# Patient Record
Sex: FEMALE | Race: WHITE | NOT HISPANIC OR LATINO | URBAN - METROPOLITAN AREA
[De-identification: names, ages, dates, MRNs, and addresses within clinical notes are randomized per-mention and may not be internally consistent; named-entity substitution may affect disease eponyms.]

---

## 2024-05-16 ENCOUNTER — TELEHEALTH PROVIDED OTHER THAN IN PATIENT'S HOME (OUTPATIENT)
Dept: URBAN - METROPOLITAN AREA TELEHEALTH 3 | Facility: TELEHEALTH | Age: 54
End: 2024-05-16
Payer: MEDICAID

## 2024-05-16 VITALS — HEIGHT: 65 IN | WEIGHT: 148 LBS

## 2024-05-16 DIAGNOSIS — R10.32 LEFT LOWER QUADRANT PAIN: ICD-10-CM

## 2024-05-16 PROCEDURE — 99204 OFFICE O/P NEW MOD 45 MIN: CPT | Mod: 95 | Performed by: PHYSICIAN ASSISTANT

## 2024-05-16 NOTE — SERVICEHPINOTES
PATIENT VERIFIED BY DATE OF BIRTH AND NAME. Patient has been consented for this telecommunication visit using [employer] application.   Last July, she started having pain on her side--had a CT scan that showed she had diverticulitis. Pain used to be a stabbing pain--now has a throbbing pain. She cannot eat any nightshade type foods as this causes significant pain in the intestines. She was taking some supplements that contained night shades--just found this out yesterday--stopped them a few days ago and is feeling better. Pt doesn't think she has anemia or thyroid disease but states that she doesn't think so. She states she's had two separate CT scan since which didn't show diverticulitis.  
br
br
Pain used to be a "20/10"--pain used to go up towards her ribs and down to her groin--radiated very high and very low. Pain was a 5/10 but two days ago since cutting out the supplements pain has dropped to a 2/10. Pain is intermittent now. She cannot even have papirika as this will worsen her pain. No pain with self palp of her lower abdomen--she can just feel tenderness. She feels nauseous a lot, has HAs, dry skin, wt loss. No blood in the stool or upon wiping. Has some solid stools and some loose stools. 
br
Mansoorhe had a neg cologuard 3 yrs ago. Her sister has IBS no family hx of CRC or IBD.

## 2024-05-16 NOTE — SERVICENOTES
Patient was located in their car during visit., I spent 20 minutes today reviewing the chart, talking with the patient, reviewing previous results with patient, discussing plan, and documenting. Patient understands and agrees with plan. Questions/concerns addressed., Patient's visit was conducted through [employer] video telecommunication. Patient consented before the start of visit as to understanding of privacy concerns, possible technological failure, and their responsibility of carrying out instructions of plan.

## 2024-12-06 ENCOUNTER — OFFICE (OUTPATIENT)
Dept: URBAN - METROPOLITAN AREA CLINIC 102 | Facility: CLINIC | Age: 54
End: 2024-12-06
Payer: MEDICAID

## 2024-12-06 VITALS
WEIGHT: 145 LBS | SYSTOLIC BLOOD PRESSURE: 152 MMHG | TEMPERATURE: 98 F | DIASTOLIC BLOOD PRESSURE: 92 MMHG | HEIGHT: 65 IN | HEART RATE: 84 BPM

## 2024-12-06 DIAGNOSIS — R63.0 ANOREXIA: ICD-10-CM

## 2024-12-06 DIAGNOSIS — R11.0 NAUSEA: ICD-10-CM

## 2024-12-06 DIAGNOSIS — R19.8 OTHER SPECIFIED SYMPTOMS AND SIGNS INVOLVING THE DIGESTIVE S: ICD-10-CM

## 2024-12-06 DIAGNOSIS — R10.9 UNSPECIFIED ABDOMINAL PAIN: ICD-10-CM

## 2024-12-06 DIAGNOSIS — Z12.11 ENCOUNTER FOR SCREENING FOR MALIGNANT NEOPLASM OF COLON: ICD-10-CM

## 2024-12-06 PROCEDURE — 99215 OFFICE O/P EST HI 40 MIN: CPT | Performed by: NURSE PRACTITIONER

## 2024-12-06 NOTE — SERVICEHPINOTES
RAQUEL DORSEY   is a   54  female who reports that a lot of her symptoms have improved with acupuncture. In August, was having LLQ pain in left side of back and does not occur unless she eats black shade foods. Now on herbal Chinese tea. Aches, joint aches and brain fog and has resolved. Skin was dry and feet peeling has improved. Pt admit that she has a strict diet and that has improved her symptoms.  halie Cooperast July, she started having pain on her side--had a CT scan that showed she had diverticulitis.  She cannot eat any nightshade type foods as this causes significant pain in the intestines. She was taking some supplements that contained night shades--just found this out yesterday--stopped them a few days ago and is feeling better. She states she's had two separate CT scan since which didn't show diverticulitis.Pt c/o nausea daily. Decreased appetite for 6 months. Denies pyrosis, dysphagia, melena, rectal bleeding. Has a BM 3-4 times a day, BSS 1-7. The other day has to take Imodium, and it resolved. Weight loss of 12 pounds in 6 months.
br
brDenies personal hx of CVD. Denies MI. Denies anticoagulant use. brDenies known kidney disease. Denies hx of stroke brDenies family hx of colon cancer. brDenies sleep apnea, COPD, asthma. br Denies hx of adverse reactions to anesthesia
br
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

## 2025-02-10 ENCOUNTER — OFFICE (OUTPATIENT)
Dept: URBAN - METROPOLITAN AREA CLINIC 98 | Facility: CLINIC | Age: 55
End: 2025-02-10
Payer: MEDICAID

## 2025-02-10 VITALS
OXYGEN SATURATION: 96 % | DIASTOLIC BLOOD PRESSURE: 58 MMHG | DIASTOLIC BLOOD PRESSURE: 57 MMHG | OXYGEN SATURATION: 100 % | RESPIRATION RATE: 20 BRPM | RESPIRATION RATE: 14 BRPM | OXYGEN SATURATION: 97 % | WEIGHT: 145 LBS | HEART RATE: 93 BPM | RESPIRATION RATE: 16 BRPM | HEART RATE: 75 BPM | SYSTOLIC BLOOD PRESSURE: 87 MMHG | DIASTOLIC BLOOD PRESSURE: 74 MMHG | OXYGEN SATURATION: 98 % | DIASTOLIC BLOOD PRESSURE: 61 MMHG | SYSTOLIC BLOOD PRESSURE: 140 MMHG | TEMPERATURE: 98.7 F | HEART RATE: 71 BPM | SYSTOLIC BLOOD PRESSURE: 94 MMHG | HEART RATE: 139 BPM | DIASTOLIC BLOOD PRESSURE: 82 MMHG | DIASTOLIC BLOOD PRESSURE: 55 MMHG | HEART RATE: 99 BPM | HEART RATE: 138 BPM | SYSTOLIC BLOOD PRESSURE: 113 MMHG | HEIGHT: 65 IN | HEART RATE: 92 BPM | SYSTOLIC BLOOD PRESSURE: 134 MMHG | RESPIRATION RATE: 18 BRPM | TEMPERATURE: 97.5 F | SYSTOLIC BLOOD PRESSURE: 86 MMHG | SYSTOLIC BLOOD PRESSURE: 81 MMHG | DIASTOLIC BLOOD PRESSURE: 56 MMHG | OXYGEN SATURATION: 99 % | HEART RATE: 79 BPM | RESPIRATION RATE: 19 BRPM | SYSTOLIC BLOOD PRESSURE: 100 MMHG | DIASTOLIC BLOOD PRESSURE: 105 MMHG

## 2025-02-10 DIAGNOSIS — K29.70 GASTRITIS, UNSPECIFIED, WITHOUT BLEEDING: ICD-10-CM

## 2025-02-10 DIAGNOSIS — K57.30 DIVERTICULOSIS OF LARGE INTESTINE WITHOUT PERFORATION OR ABS: ICD-10-CM

## 2025-02-10 DIAGNOSIS — K57.32 DIVERTICULITIS OF LARGE INTESTINE WITHOUT PERFORATION OR ABS: ICD-10-CM

## 2025-02-10 DIAGNOSIS — K29.50 UNSPECIFIED CHRONIC GASTRITIS WITHOUT BLEEDING: ICD-10-CM

## 2025-02-10 DIAGNOSIS — D12.5 BENIGN NEOPLASM OF SIGMOID COLON: ICD-10-CM

## 2025-02-10 DIAGNOSIS — R19.4 CHANGE IN BOWEL HABIT: ICD-10-CM

## 2025-02-10 DIAGNOSIS — R11.0 NAUSEA: ICD-10-CM

## 2025-02-10 DIAGNOSIS — R10.32 LEFT LOWER QUADRANT PAIN: ICD-10-CM

## 2025-02-10 PROBLEM — K63.5 POLYP OF COLON: Status: ACTIVE | Noted: 2025-02-10

## 2025-03-18 ENCOUNTER — OFFICE (OUTPATIENT)
Dept: URBAN - METROPOLITAN AREA CLINIC 102 | Facility: CLINIC | Age: 55
End: 2025-03-18
Payer: MEDICAID

## 2025-03-18 VITALS
SYSTOLIC BLOOD PRESSURE: 121 MMHG | TEMPERATURE: 98.1 F | DIASTOLIC BLOOD PRESSURE: 84 MMHG | HEIGHT: 65 IN | HEART RATE: 91 BPM | WEIGHT: 147 LBS

## 2025-03-18 DIAGNOSIS — R11.0 NAUSEA: ICD-10-CM

## 2025-03-18 DIAGNOSIS — R10.32 LEFT LOWER QUADRANT PAIN: ICD-10-CM

## 2025-03-18 DIAGNOSIS — R19.4 CHANGE IN BOWEL HABIT: ICD-10-CM

## 2025-03-18 PROCEDURE — 99214 OFFICE O/P EST MOD 30 MIN: CPT | Performed by: NURSE PRACTITIONER

## 2025-03-18 NOTE — SERVICEHPINOTES
RAQUEL DORSEY   is a   54  female who is here to follow up after EGD/colon done for nausea. Pt wondering why she is losing her hair. 
br
br Having pain in LLQ when she eats squash, carrots and night shade foods will get abdominal pain and diarrhea. Denies upper abdominal painbr
br Weight loss of 6 pounds since last visit but is now gaining it back. Pt c/o nausea was occurring daily now only occurring once time a week.   Denies  dysphagia, melena, rectal bleeding. Having heartburn 1 time a week and takes apple cider vinegar and "I'm not big on medication."
br
br Pt also mentions she has more energy now and her early satiety and loss of appetite has resolved. Currently doing  acupuncture every 2 weeks.br
br Has a BM 2 times a day, . if she eats carrots, pumpkin or squash will get BSS 1-7.